# Patient Record
Sex: FEMALE | Race: WHITE
[De-identification: names, ages, dates, MRNs, and addresses within clinical notes are randomized per-mention and may not be internally consistent; named-entity substitution may affect disease eponyms.]

---
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
## 2018-03-12 ENCOUNTER — HOSPITAL ENCOUNTER (EMERGENCY)
Dept: HOSPITAL 58 - ED | Age: 18
Discharge: HOME | End: 2018-03-12

## 2018-03-12 VITALS — TEMPERATURE: 99 F | DIASTOLIC BLOOD PRESSURE: 91 MMHG | SYSTOLIC BLOOD PRESSURE: 143 MMHG

## 2018-03-12 VITALS — BODY MASS INDEX: 22.2 KG/M2

## 2018-03-12 DIAGNOSIS — Z33.1: ICD-10-CM

## 2018-03-12 DIAGNOSIS — R11.2: Primary | ICD-10-CM

## 2018-03-12 PROCEDURE — 81025 URINE PREGNANCY TEST: CPT

## 2018-03-12 PROCEDURE — 87086 URINE CULTURE/COLONY COUNT: CPT

## 2018-03-12 PROCEDURE — 99282 EMERGENCY DEPT VISIT SF MDM: CPT

## 2018-03-12 PROCEDURE — 81001 URINALYSIS AUTO W/SCOPE: CPT

## 2018-05-31 ENCOUNTER — HOSPITAL ENCOUNTER (OUTPATIENT)
Dept: HOSPITAL 58 - RHC-LAB | Age: 18
Discharge: HOME | End: 2018-05-31
Attending: NURSE PRACTITIONER

## 2018-05-31 VITALS — BODY MASS INDEX: 22.2 KG/M2

## 2018-05-31 DIAGNOSIS — N39.0: Primary | ICD-10-CM

## 2018-05-31 PROCEDURE — 81001 URINALYSIS AUTO W/SCOPE: CPT

## 2018-05-31 PROCEDURE — 87186 SC STD MICRODIL/AGAR DIL: CPT

## 2018-05-31 PROCEDURE — 87086 URINE CULTURE/COLONY COUNT: CPT

## 2020-11-11 NOTE — ED.PDOC
----- Message from [facility] sent at 11/11/2020 10:54 AM EST -----  Regarding: Dr. Joan Patel/telephone  Medication Refill    Caller (if not patient):      Relationship of caller (if not patient):      Best contact number(s): [phone]      Name of medication and dosage if known: Allopurinole 100mg      Is patient out of this medication (yes/no): Yes       Pharmacy name: [facility] listed in chart? (yes/no): NO  Pharmacy phone number: [phone]      Details to clarify the request: Please do not sent to [facility], please send to [phone] [place] [address] [facility]


ED Provider: 


Dr. NASREEN COYNE





Chief Complaint: Nausea/Vomiting


Stated Complaint: nausea x 10 days


Time Seen by Physician: 13:30


Mode of Arrival: Walk-In


Information Source: Patient


Exam Limitations: No limitations


[facility] 


[facility]





Nursing and Triage Documentation Reviewed and Agree: Yes


Reviewed sepsis parameters & appropriate labs ordered?: Yes


System Inflammatory Response Syndrome: Not Applicable


Sepsis Protocol: 


For patient's 13 years and over:





Temp is 96.8 and below  and greater


Pulse >90 BPM


Resp >20/minute


Acutely Altered Mental Status





Are patient's symptoms suggestive of a new infection, such as:


   -Pneumonia


   -Skin, Soft Tissue


   -Endocarditis


   -UTI


   -Bone, Joint Infection


   -Implantable Device


   -Acute Abdominal Infection


   -Wound Infection


   -Meningitis


   -Blood Stream Catheter Infection


   -Unknown





System Inflammatory Response Syndrome: Not Applicable





GI Complaint Exam





- Vomiting/Diarrhea Complaint/Exam


Onset/Duration: 1 week  to 10 days


Episodes of Vomiting over last 24 Hours: 0


Episodes of Diarrhea Over Last 24 Hours: 0


Alleviating: Reports: None


Associated Signs and Symptoms: Denies: Dizziness, Light-headedness, Melena, 

Hematemesis, Fever, Abdominal pain, Cramping





Review of Systems





- Review Of Systems


Constitutional: Reports: No symptoms


Eyes: Reports: No symptoms


Ears, Nose, Mouth, Throat: Reports: No symptoms


Respiratory: Reports: No symptoms


Cardiac: Reports: No symptoms


GI: Reports: Nausea


: Reports: No symptoms


Musculoskeletal: Reports: No symptoms


Skin: Reports: No symptoms


Neurological: Reports: No symptoms


Endocrine: Reports: No symptoms


Hematologic/Lymphatic: Reports: No symptoms


All Other Systems: Reviewed and Negative





Past Medical History





- Past Medical History


Previously Healthy: Yes


Endocrine: Reports: None


Cardiovascular: Reports: None


Respiratory: Reports: Asthma


Hematological: Reports: None


Gastrointestinal: Reports: None


Genitourinary: Reports: None


Neuro/Psych: Reports: None


Musculoskeletal: Reports: None


Cancer: Reports: None


Last Menstrual Period: feb 2





- Surgical History


General Surgical History: Reports: Tonsillectomy, Adenoidectomy





- Family History


Family History: Reports: None





- Social History


Smoking Status: Never smoker


Hx Substance Use: No


Alcohol Screening: None





- Immunizations


Tetanus Shot up to Date: Yes





Physical Exam





- Physical Exam


Appearance: Well-appearing, No pain distress, Well-nourished


Eyes: JUNIOR, EOMI, Conjunctiva clear


ENT: Ears normal, Nose normal, Oropharynx normal


Respiratory: Airway patent, Breath sounds clear, Breath sounds equal, 

Respirations nonlabored


Cardiovascular: RRR, Pulses normal, No rub, No murmur


GI/: Soft, Nontender, No masses, Bowel sounds normal, No Organomegaly


Musculoskeletal: Normal strength, ROM intact, No edema, No calf tenderness


Skin: Warm, Dry, Normal color


Neurological: Sensation intact, Motor intact, Reflexes intact, Cranial nerves 

intact, Alert, Oriented


Psychiatric: Affect appropriate, Mood appropriate





Critical Care Note





- Critical Care Note


Total Time (mins): 0





Course





- Course


Orders, Labs, Meds: 





Lab Review











  03/12/18 03/12/18





  13:34 13:35


 


Urine Color   Yellow


 


Urine Clarity   Slightly


 


Urine pH   5.5


 


Ur Specific Gravity   1.025


 


Urine Protein   Trace


 


Urine Glucose (UA)   Negative


 


Urine Ketones   4+


 


Urine Blood   2+


 


Urine Nitrite   Negative


 


Urine Bilirubin   Negative


 


Urine Urobilinogen   0.2


 


Ur Leukocyte Esterase   3+


 


Urine Microscopic RBC   5-10


 


Urine Microscopic WBC   10-20


 


Ur Squamous Epith Cells   30-50


 


Urine Bacteria   1+


 


Urine Pregnancy Test  Positive 








Orders











 Category Date Time Status


 


 URINALYSIS C & S IF INDICATED Stat LAB  03/12/18 13:35 Completed


 


 URINE CULTURE Stat LAB  03/12/18 13:35 Received


 


 URINE PREGNANCY Stat LAB  03/12/18 13:34 Completed











Vital Signs: 





 











  Temp Pulse Resp BP Pulse Ox


 


 03/12/18 13:22  99 F  117 H  16  143/91 H  98














Departure





- Departure


Time of Disposition: 14:20


Disposition: HOME SELF-CARE


Discharge Problem: 


 Nausea, Pregnancy





Instructions:  Pregnancy (ED)


Condition: Good


Pt referred to PMD for follow-up: Yes


IPMP verified?: No


Additional Instructions: 


Please call your Family Physician as soon as possible to schedule a follow-up 

appointment.


Allergies/Adverse Reactions: 


Allergies





Sulfa (Sulfonamide Antibiotics) Adverse Reaction (Verified 03/12/18 13:29)


 








Home Medications: 


Ambulatory Orders





Escitalopram Oxalate [Lexapro] 20 mg PO DAILY 03/12/18 


Trazodone HCl 50 mg PO DAILY 03/12/18